# Patient Record
Sex: FEMALE | Race: WHITE | Employment: OTHER | URBAN - METROPOLITAN AREA
[De-identification: names, ages, dates, MRNs, and addresses within clinical notes are randomized per-mention and may not be internally consistent; named-entity substitution may affect disease eponyms.]

---

## 2019-04-22 LAB
ABO + RH BLD: NORMAL
ABO + RH BLD: NORMAL
BLD GP AB SCN SERPL QL: NORMAL
HBV SURFACE AG SERPL QL IA: NORMAL
RUBELLA ABY IGG: 24
RUBELLA ANTIBODY IGG QUANTITATIVE: NORMAL IU/ML

## 2019-11-25 ENCOUNTER — TRANSFERRED RECORDS (OUTPATIENT)
Dept: HEALTH INFORMATION MANAGEMENT | Facility: CLINIC | Age: 34
End: 2019-11-25

## 2019-11-29 LAB — GROUP B STREP PCR: NORMAL

## 2019-12-20 ASSESSMENT — MIFFLIN-ST. JEOR: SCORE: 1387.61

## 2019-12-27 ENCOUNTER — HOSPITAL ENCOUNTER (INPATIENT)
Facility: CLINIC | Age: 34
LOS: 3 days | Discharge: HOME | End: 2019-12-30
Attending: OBSTETRICS & GYNECOLOGY | Admitting: OBSTETRICS & GYNECOLOGY
Payer: COMMERCIAL

## 2019-12-27 PROCEDURE — G0463 HOSPITAL OUTPT CLINIC VISIT: HCPCS | Mod: 25

## 2019-12-27 PROCEDURE — 12000000 ZZH R&B MED SURG/OB

## 2019-12-27 PROCEDURE — 59025 FETAL NON-STRESS TEST: CPT

## 2019-12-27 RX ORDER — METHYLERGONOVINE MALEATE 0.2 MG/ML
200 INJECTION INTRAVENOUS
Status: DISCONTINUED | OUTPATIENT
Start: 2019-12-27 | End: 2019-12-28

## 2019-12-27 RX ORDER — SODIUM CHLORIDE, SODIUM LACTATE, POTASSIUM CHLORIDE, CALCIUM CHLORIDE 600; 310; 30; 20 MG/100ML; MG/100ML; MG/100ML; MG/100ML
INJECTION, SOLUTION INTRAVENOUS CONTINUOUS
Status: DISCONTINUED | OUTPATIENT
Start: 2019-12-27 | End: 2019-12-28

## 2019-12-27 RX ORDER — OXYTOCIN 10 [USP'U]/ML
10 INJECTION, SOLUTION INTRAMUSCULAR; INTRAVENOUS
Status: DISCONTINUED | OUTPATIENT
Start: 2019-12-27 | End: 2019-12-28

## 2019-12-27 RX ORDER — CLINDAMYCIN PHOSPHATE 900 MG/50ML
900 INJECTION, SOLUTION INTRAVENOUS EVERY 8 HOURS
Status: DISCONTINUED | OUTPATIENT
Start: 2019-12-27 | End: 2019-12-28

## 2019-12-27 RX ORDER — ACETAMINOPHEN 325 MG/1
650 TABLET ORAL EVERY 4 HOURS PRN
Status: DISCONTINUED | OUTPATIENT
Start: 2019-12-27 | End: 2019-12-28

## 2019-12-27 RX ORDER — ONDANSETRON 2 MG/ML
4 INJECTION INTRAMUSCULAR; INTRAVENOUS EVERY 6 HOURS PRN
Status: DISCONTINUED | OUTPATIENT
Start: 2019-12-27 | End: 2019-12-28

## 2019-12-27 RX ORDER — OXYTOCIN/0.9 % SODIUM CHLORIDE 30/500 ML
100-340 PLASTIC BAG, INJECTION (ML) INTRAVENOUS CONTINUOUS PRN
Status: COMPLETED | OUTPATIENT
Start: 2019-12-27 | End: 2019-12-28

## 2019-12-27 RX ORDER — IBUPROFEN 400 MG/1
800 TABLET, FILM COATED ORAL
Status: DISCONTINUED | OUTPATIENT
Start: 2019-12-27 | End: 2019-12-28

## 2019-12-27 RX ORDER — OXYCODONE AND ACETAMINOPHEN 5; 325 MG/1; MG/1
1 TABLET ORAL
Status: DISCONTINUED | OUTPATIENT
Start: 2019-12-27 | End: 2019-12-28

## 2019-12-27 RX ORDER — CARBOPROST TROMETHAMINE 250 UG/ML
250 INJECTION, SOLUTION INTRAMUSCULAR
Status: DISCONTINUED | OUTPATIENT
Start: 2019-12-27 | End: 2019-12-28

## 2019-12-27 RX ORDER — NALOXONE HYDROCHLORIDE 0.4 MG/ML
.1-.4 INJECTION, SOLUTION INTRAMUSCULAR; INTRAVENOUS; SUBCUTANEOUS
Status: DISCONTINUED | OUTPATIENT
Start: 2019-12-27 | End: 2019-12-28

## 2019-12-28 ENCOUNTER — ANESTHESIA (OUTPATIENT)
Dept: OBGYN | Facility: CLINIC | Age: 34
End: 2019-12-28
Payer: COMMERCIAL

## 2019-12-28 ENCOUNTER — ANESTHESIA EVENT (OUTPATIENT)
Dept: OBGYN | Facility: CLINIC | Age: 34
End: 2019-12-28
Payer: COMMERCIAL

## 2019-12-28 LAB
ABO + RH BLD: NORMAL
ABO + RH BLD: NORMAL
BLD GP AB SCN SERPL QL: NORMAL
BLOOD BANK CMNT PATIENT-IMP: NORMAL
SPECIMEN EXP DATE BLD: NORMAL
T PALLIDUM AB SER QL: NONREACTIVE

## 2019-12-28 PROCEDURE — 00HU33Z INSERTION OF INFUSION DEVICE INTO SPINAL CANAL, PERCUTANEOUS APPROACH: ICD-10-PCS | Performed by: ANESTHESIOLOGY

## 2019-12-28 PROCEDURE — 25000125 ZZHC RX 250: Performed by: ANESTHESIOLOGY

## 2019-12-28 PROCEDURE — 36415 COLL VENOUS BLD VENIPUNCTURE: CPT | Performed by: OBSTETRICS & GYNECOLOGY

## 2019-12-28 PROCEDURE — 25800030 ZZH RX IP 258 OP 636: Performed by: ANESTHESIOLOGY

## 2019-12-28 PROCEDURE — 25000128 H RX IP 250 OP 636: Performed by: ANESTHESIOLOGY

## 2019-12-28 PROCEDURE — G0463 HOSPITAL OUTPT CLINIC VISIT: HCPCS | Mod: 25

## 2019-12-28 PROCEDURE — 25000132 ZZH RX MED GY IP 250 OP 250 PS 637: Performed by: OBSTETRICS & GYNECOLOGY

## 2019-12-28 PROCEDURE — 25000128 H RX IP 250 OP 636: Performed by: OBSTETRICS & GYNECOLOGY

## 2019-12-28 PROCEDURE — 25000125 ZZHC RX 250: Performed by: OBSTETRICS & GYNECOLOGY

## 2019-12-28 PROCEDURE — 0DQR0ZZ REPAIR ANAL SPHINCTER, OPEN APPROACH: ICD-10-PCS | Performed by: OBSTETRICS & GYNECOLOGY

## 2019-12-28 PROCEDURE — 25800030 ZZH RX IP 258 OP 636: Performed by: OBSTETRICS & GYNECOLOGY

## 2019-12-28 PROCEDURE — 86901 BLOOD TYPING SEROLOGIC RH(D): CPT | Performed by: OBSTETRICS & GYNECOLOGY

## 2019-12-28 PROCEDURE — 37000011 ZZH ANESTHESIA WARD SERVICE

## 2019-12-28 PROCEDURE — 12000035 ZZH R&B POSTPARTUM

## 2019-12-28 PROCEDURE — 59025 FETAL NON-STRESS TEST: CPT

## 2019-12-28 PROCEDURE — 3E0R3BZ INTRODUCTION OF ANESTHETIC AGENT INTO SPINAL CANAL, PERCUTANEOUS APPROACH: ICD-10-PCS | Performed by: ANESTHESIOLOGY

## 2019-12-28 PROCEDURE — 72200001 ZZH LABOR CARE VAGINAL DELIVERY SINGLE

## 2019-12-28 PROCEDURE — 86850 RBC ANTIBODY SCREEN: CPT | Performed by: OBSTETRICS & GYNECOLOGY

## 2019-12-28 PROCEDURE — 86780 TREPONEMA PALLIDUM: CPT | Performed by: OBSTETRICS & GYNECOLOGY

## 2019-12-28 PROCEDURE — 86900 BLOOD TYPING SEROLOGIC ABO: CPT | Performed by: OBSTETRICS & GYNECOLOGY

## 2019-12-28 RX ORDER — OXYTOCIN/0.9 % SODIUM CHLORIDE 30/500 ML
100 PLASTIC BAG, INJECTION (ML) INTRAVENOUS CONTINUOUS
Status: DISCONTINUED | OUTPATIENT
Start: 2019-12-28 | End: 2019-12-30 | Stop reason: HOSPADM

## 2019-12-28 RX ORDER — NALBUPHINE HYDROCHLORIDE 10 MG/ML
2.5-5 INJECTION, SOLUTION INTRAMUSCULAR; INTRAVENOUS; SUBCUTANEOUS EVERY 6 HOURS PRN
Status: DISCONTINUED | OUTPATIENT
Start: 2019-12-28 | End: 2019-12-28

## 2019-12-28 RX ORDER — CEFAZOLIN SODIUM 1 G/3ML
1 INJECTION, POWDER, FOR SOLUTION INTRAMUSCULAR; INTRAVENOUS EVERY 8 HOURS
Status: DISCONTINUED | OUTPATIENT
Start: 2019-12-28 | End: 2019-12-28

## 2019-12-28 RX ORDER — NALOXONE HYDROCHLORIDE 0.4 MG/ML
.1-.4 INJECTION, SOLUTION INTRAMUSCULAR; INTRAVENOUS; SUBCUTANEOUS
Status: DISCONTINUED | OUTPATIENT
Start: 2019-12-28 | End: 2019-12-30 | Stop reason: HOSPADM

## 2019-12-28 RX ORDER — ACETAMINOPHEN 325 MG/1
650 TABLET ORAL EVERY 4 HOURS PRN
Status: DISCONTINUED | OUTPATIENT
Start: 2019-12-28 | End: 2019-12-30 | Stop reason: HOSPADM

## 2019-12-28 RX ORDER — BISACODYL 10 MG
10 SUPPOSITORY, RECTAL RECTAL DAILY PRN
Status: DISCONTINUED | OUTPATIENT
Start: 2019-12-30 | End: 2019-12-30 | Stop reason: HOSPADM

## 2019-12-28 RX ORDER — OXYTOCIN 10 [USP'U]/ML
10 INJECTION, SOLUTION INTRAMUSCULAR; INTRAVENOUS
Status: DISCONTINUED | OUTPATIENT
Start: 2019-12-28 | End: 2019-12-30 | Stop reason: HOSPADM

## 2019-12-28 RX ORDER — ROPIVACAINE HYDROCHLORIDE 2 MG/ML
10 INJECTION, SOLUTION EPIDURAL; INFILTRATION; PERINEURAL ONCE
Status: COMPLETED | OUTPATIENT
Start: 2019-12-28 | End: 2019-12-28

## 2019-12-28 RX ORDER — LIDOCAINE HYDROCHLORIDE AND EPINEPHRINE 15; 5 MG/ML; UG/ML
INJECTION, SOLUTION EPIDURAL PRN
Status: DISCONTINUED | OUTPATIENT
Start: 2019-12-28 | End: 2019-12-29 | Stop reason: HOSPADM

## 2019-12-28 RX ORDER — OXYTOCIN/0.9 % SODIUM CHLORIDE 30/500 ML
1-24 PLASTIC BAG, INJECTION (ML) INTRAVENOUS CONTINUOUS
Status: DISCONTINUED | OUTPATIENT
Start: 2019-12-28 | End: 2019-12-28

## 2019-12-28 RX ORDER — CEFAZOLIN SODIUM 1 G/3ML
INJECTION, POWDER, FOR SOLUTION INTRAMUSCULAR; INTRAVENOUS
Status: DISCONTINUED
Start: 2019-12-28 | End: 2019-12-28 | Stop reason: HOSPADM

## 2019-12-28 RX ORDER — IBUPROFEN 400 MG/1
800 TABLET, FILM COATED ORAL EVERY 6 HOURS PRN
Status: DISCONTINUED | OUTPATIENT
Start: 2019-12-28 | End: 2019-12-30 | Stop reason: HOSPADM

## 2019-12-28 RX ORDER — FENTANYL/BUPIVACAINE/NS/PF 2-1250MCG
12 PLASTIC BAG, INJECTION (ML) INJECTION CONTINUOUS
Status: DISCONTINUED | OUTPATIENT
Start: 2019-12-28 | End: 2019-12-28

## 2019-12-28 RX ORDER — AMOXICILLIN 250 MG
1 CAPSULE ORAL 2 TIMES DAILY
Status: DISCONTINUED | OUTPATIENT
Start: 2019-12-28 | End: 2019-12-30 | Stop reason: HOSPADM

## 2019-12-28 RX ORDER — HYDROCORTISONE 2.5 %
CREAM (GRAM) TOPICAL 3 TIMES DAILY PRN
Status: DISCONTINUED | OUTPATIENT
Start: 2019-12-28 | End: 2019-12-30 | Stop reason: HOSPADM

## 2019-12-28 RX ORDER — HYDROMORPHONE HYDROCHLORIDE 1 MG/ML
0.3 INJECTION, SOLUTION INTRAMUSCULAR; INTRAVENOUS; SUBCUTANEOUS
Status: DISCONTINUED | OUTPATIENT
Start: 2019-12-28 | End: 2019-12-30 | Stop reason: HOSPADM

## 2019-12-28 RX ORDER — FENTANYL CITRATE 50 UG/ML
100 INJECTION, SOLUTION INTRAMUSCULAR; INTRAVENOUS ONCE
Status: COMPLETED | OUTPATIENT
Start: 2019-12-28 | End: 2019-12-28

## 2019-12-28 RX ORDER — EPHEDRINE SULFATE 50 MG/ML
5 INJECTION, SOLUTION INTRAMUSCULAR; INTRAVENOUS; SUBCUTANEOUS
Status: DISCONTINUED | OUTPATIENT
Start: 2019-12-28 | End: 2019-12-28

## 2019-12-28 RX ORDER — OXYTOCIN/0.9 % SODIUM CHLORIDE 30/500 ML
340 PLASTIC BAG, INJECTION (ML) INTRAVENOUS CONTINUOUS PRN
Status: DISCONTINUED | OUTPATIENT
Start: 2019-12-28 | End: 2019-12-30 | Stop reason: HOSPADM

## 2019-12-28 RX ORDER — NALOXONE HYDROCHLORIDE 0.4 MG/ML
.1-.4 INJECTION, SOLUTION INTRAMUSCULAR; INTRAVENOUS; SUBCUTANEOUS
Status: DISCONTINUED | OUTPATIENT
Start: 2019-12-28 | End: 2019-12-28

## 2019-12-28 RX ORDER — LANOLIN 100 %
OINTMENT (GRAM) TOPICAL
Status: DISCONTINUED | OUTPATIENT
Start: 2019-12-28 | End: 2019-12-30 | Stop reason: HOSPADM

## 2019-12-28 RX ORDER — LIDOCAINE 40 MG/G
CREAM TOPICAL
Status: DISCONTINUED | OUTPATIENT
Start: 2019-12-28 | End: 2019-12-28

## 2019-12-28 RX ORDER — MULTIPLE VITAMINS W/ MINERALS TAB 9MG-400MCG
1 TAB ORAL DAILY
COMMUNITY

## 2019-12-28 RX ORDER — CEFAZOLIN SODIUM 2 G/100ML
2 INJECTION, SOLUTION INTRAVENOUS ONCE
Status: COMPLETED | OUTPATIENT
Start: 2019-12-28 | End: 2019-12-28

## 2019-12-28 RX ORDER — AMOXICILLIN 250 MG
2 CAPSULE ORAL 2 TIMES DAILY
Status: DISCONTINUED | OUTPATIENT
Start: 2019-12-28 | End: 2019-12-30 | Stop reason: HOSPADM

## 2019-12-28 RX ORDER — OXYCODONE HYDROCHLORIDE 5 MG/1
5 TABLET ORAL EVERY 4 HOURS PRN
Status: DISCONTINUED | OUTPATIENT
Start: 2019-12-28 | End: 2019-12-30 | Stop reason: HOSPADM

## 2019-12-28 RX ADMIN — CEFAZOLIN SODIUM 2 G: 2 INJECTION, SOLUTION INTRAVENOUS at 01:06

## 2019-12-28 RX ADMIN — IBUPROFEN 800 MG: 400 TABLET ORAL at 21:59

## 2019-12-28 RX ADMIN — Medication 12 ML/HR: at 01:03

## 2019-12-28 RX ADMIN — LIDOCAINE HYDROCHLORIDE AND EPINEPHRINE 3 ML: 15; 5 INJECTION, SOLUTION EPIDURAL at 01:02

## 2019-12-28 RX ADMIN — Medication 2 MILLI-UNITS/MIN: at 09:15

## 2019-12-28 RX ADMIN — Medication 340 ML/HR: at 12:13

## 2019-12-28 RX ADMIN — ROPIVACAINE HYDROCHLORIDE 5 ML: 2 INJECTION, SOLUTION EPIDURAL; INFILTRATION at 01:07

## 2019-12-28 RX ADMIN — FENTANYL CITRATE 100 MCG: 50 INJECTION, SOLUTION INTRAMUSCULAR; INTRAVENOUS at 08:00

## 2019-12-28 RX ADMIN — IBUPROFEN 800 MG: 400 TABLET ORAL at 15:48

## 2019-12-28 RX ADMIN — ACETAMINOPHEN 650 MG: 325 TABLET, FILM COATED ORAL at 15:48

## 2019-12-28 RX ADMIN — SODIUM CHLORIDE, POTASSIUM CHLORIDE, SODIUM LACTATE AND CALCIUM CHLORIDE 1000 ML: 600; 310; 30; 20 INJECTION, SOLUTION INTRAVENOUS at 00:13

## 2019-12-28 RX ADMIN — SENNOSIDES AND DOCUSATE SODIUM 1 TABLET: 8.6; 5 TABLET ORAL at 19:56

## 2019-12-28 RX ADMIN — Medication 12 ML/HR: at 08:15

## 2019-12-28 RX ADMIN — SODIUM CHLORIDE, POTASSIUM CHLORIDE, SODIUM LACTATE AND CALCIUM CHLORIDE: 600; 310; 30; 20 INJECTION, SOLUTION INTRAVENOUS at 01:40

## 2019-12-28 RX ADMIN — ACETAMINOPHEN 650 MG: 325 TABLET, FILM COATED ORAL at 21:59

## 2019-12-28 RX ADMIN — Medication 5 MG: at 01:42

## 2019-12-28 ASSESSMENT — LIFESTYLE VARIABLES: TOBACCO_USE: 0

## 2019-12-28 NOTE — ANESTHESIA PREPROCEDURE EVALUATION
"Anesthesia Pre-Procedure Evaluation    Patient: Patricia Kelly   MRN: [id number] : 1985          Preoperative Diagnosis: * No surgery found *        History reviewed. No pertinent past medical history.  History reviewed. No pertinent surgical history.    Anesthesia Evaluation       history and physical reviewed .      No history of anesthetic complications          ROS/MED HX    ENT/Pulmonary:  - neg pulmonary ROS    (-) tobacco use   Neurologic:  - neg neurologic ROS     Cardiovascular:  - neg cardiovascular ROS       METS/Exercise Tolerance:     Hematologic:         Musculoskeletal:         GI/Hepatic:  - neg GI/hepatic ROS       Renal/Genitourinary:         Endo:         Psychiatric:         Infectious Disease:         Malignancy:         Other:                     neg OB ROS            Physical Exam  Normal systems: cardiovascular, pulmonary and dental    Airway   Mallampati: II    Dental     Cardiovascular       Pulmonary             No results found for: WBC, HGB, HCT, PLT, CRP, SED, NA, POTASSIUM, CHLORIDE, CO2, BUN, CR, GLC, WILL, PHOS, MAG, ALBUMIN, PROTTOTAL, ALT, AST, GGT, ALKPHOS, BILITOTAL, BILIDIRECT, LIPASE, AMYLASE, KAEL, PTT, INR, FIBR, TSH, T4, T3, HCG, HCGS, CKTOTAL, CKMB, TROPN    Preop Vitals  BP Readings from Last 3 Encounters:   19 127/83    Pulse Readings from Last 3 Encounters:   19 56      Resp Readings from Last 3 Encounters:   19    SpO2 Readings from Last 3 Encounters:   No data found for SpO2      Temp Readings from Last 1 Encounters:   19 36.8  C (98.2  F) (Temporal)    Ht Readings from Last 1 Encounters:   19 1.626 m (5' 4\")      Wt Readings from Last 1 Encounters:   19 70.8 kg (156 lb)    Estimated body mass index is 26.78 kg/m  as calculated from the following:    Height as of this encounter: 1.626 m (5' 4\").    Weight as of this encounter: 70.8 kg (156 lb).       Anesthesia Plan      History & Physical Review      ASA Status:  2 .  " OB Epidural Asa: 2            Postoperative Care      Consents  Anesthetic plan, risks, benefits and alternatives discussed with:  Patient..                 Willie Cherry MD

## 2019-12-28 NOTE — L&D DELIVERY NOTE
OB Vaginal Delivery Note    Patricia Kelly MRN# [id number]   Age: 34 year old YOB: 1985       GA: 41w0d  GP:   Labor Complications: None;Failure to Progress in Second Stage   Delivery QBL:  Being calculated  Delivery Type: Vaginal, Vacuum (Extractor)   ROM to Delivery Time: (Delivered) Hours: 10 Minutes: 57   Weight: 7#15 ounces   1 Minute 5 Minute 10 Minute   Apgar Totals: 7    8              Admission details:  at 41w0d admitted in spontaneous labor and SROM at approx 0130.  She is GBS positive and she was started on IV antibiotics. She had an epidural with good relief.  She progressed to complete at 0605.  She started pushing at 0830.  Pitocin was started with pushing to increase contraction frequency. She pushed for 3 hours with good descent but then started to experience maternal exhaustion.  She was consented for a vacuum assisted delivery. Risks of the procedure were reviewed including bleeding, maternal injury and lacerations, and fetal scalp lacerations/injury.   Vacuum was applied at the +3 position.  Guerrero was recently removed.  With each contraction, the pressure was increased to 45 cmHG and three pulls with three pushes.  There was good descent with each pull.  There were three pop offs which brought the head to a +4-+5 position.  The patient was able to push with contractions to complete the delivery.     Delivery Details:  Patricia Kelly, a 34 year old  female delivered a viable infant with apgars of 7   and 8  . Delivery was via vaginal, vacuum (extractor)  to a sterile field under epidural  anesthesia. Infant delivered in  right  occiput  anterior  position. Anterior and posterior shoulders delivered without difficulty. The cord was clamped, cut twice and no abnormalities were noted.   Cord complications: none  Placenta delivered at 2019 12:12 PM . Placental disposition was Hospital disposal . Fundal massage performed and fundus found to be  firm.     Episiotomy: none    Perineum, vagina, cervix were inspected, and the following lacerations were noted:   Perineal lacerations: 3rd with superficial lacerations around the hemorrhoid tissue.   All lacerations were repaired in the usual fashion using 3-0 vicryl.    Excellent hemostasis was noted. Needle count correct. Infant and patient in delivery room in good and stable condition.        Labor Event Times    Labor onset date:  19 Onset time:  12:00 AM   Dilation complete date:  19 Complete time:   6:05 AM   Start pushing date/time:  2019 0830      Labor Events     labor?:  No  Labor Type:  Spontaneous  Predominate monitoring during 1st stage:  continuous electronic fetal monitoring     Antibiotics received during labor?:  Yes  Reason for Antibiotics:  GBS  Antibiotics received for GBS:  Cefazolin  Antibiotics Given (GBS):  Greater than 4 hours prior to delivery     Rupture date/time: 19 0112   Rupture type:  Spontaneous rupture of membranes occuring during spontaneous labor or augmentation  Fluid color:  Clear, Meconium  Fluid odor:  Normal     Delivery/Placenta Date and Time    Delivery Date:  19 Delivery Time:  12:09 PM   Placenta Date/Time:  2019 12:12 PM  Oxytocin given at the time of delivery:  after delivery of placenta     Vaginal Counts     Initial count performed by 2 team members:   Two Team Members   Dr Jason Gutierrez       Needles Suture [place] Sponges Instruments   Initial counts 2  5    Added to count  3     Final counts       Placed during labor Accounted for at the end of labor   No    No    No     Final count performed by 2 team members:   Two Team Members   Dr Jason Gutierrez             Apgars    Living status:  Living   1 Minute 5 Minute 10 Minute 15 Minute 20 Minute   Skin color: 0  0       Heart rate: 2  2       Reflex irritability: 2  2       Muscle tone: 1  2       Respiratory effort: 2  2       Total: 7  8       Apgars assigned by:   SEEMA CHANCE,MERE     Labor Events and Shoulder Dystocia    Fetal Tracing Prior to Delivery:  Category 1, Category 2  Shoulder dystocia present?:  Neg             Delivery (Maternal) (Provider to Complete) (403429)    Episiotomy:  None  Perineal lacerations:  3rd Repaired?:  Yes      Blood Loss  Mother: Patricia Kelly #[id number]   Start of Mother's Information    IO Blood Loss  12/28/19 0000 - 12/28/19 1242    None           End of Mother's Information  Mother: Patricia Kelly #[id number]         Delivery - Provider to Complete (198900)    Attempted Delivery Types (Choose all that apply):  Spontaneous Vaginal Delivery  Delivery Type (Choose the 1 that will go to the Birth History):  Vaginal, Vacuum (Extractor)  Verbal Informed Consent Obtained For Vacuum:  Yes Alternate Labor Strategies Discussed (vacuum):  Yes   Emergency Resources Available (vacuum):  Charge Nurse/Team, Resuscitation Team  Type (vacuum):  mighty vac mushroom hand held Accrued Pulling Time (vacuum):  160 seconds   # of Pop-Offs (vacuum):  3 # of Pulls/Contractions (vacuum):  3   Position (vacuum):  OA Fetal Station (vacuum):  +3   Indication for Operative Vaginal Delivery (vacuum):  Maternal Exhaustion          Placenta    Date/Time:  12/28/2019 12:12 PM  Removal:  Spontaneous  Disposition:  Hospital disposal     Anesthesia    Method:  Epidural  Cervical dilation at placement:  4-7          Presentation and Position    Position:  Right Occiput Anterior           Dannie Gomez MD

## 2019-12-28 NOTE — PLAN OF CARE
Data: Patricia Kelly transferred to 426 via wheelchair at 1520. Baby transferred via parent's arms.  Action: Receiving unit notified of transfer: Yes. Patient and family notified of room change. Report given to Serene Valenzuelasen at 1525. Belongings sent to receiving unit. Accompanied by Registered Nurse. Oriented patient to surroundings. Call light within reach. ID bands double-checked with receiving RN.  Response: Patient tolerated transfer and is stable.

## 2019-12-28 NOTE — ANESTHESIA PROCEDURE NOTES
Peripheral nerve/Neuraxial procedure note : epidural catheter  Pre-Procedure  Performed by Willie Cheryr MD  Location: OB      Pre-Anesthestic Checklist: patient identified, IV checked, site marked, risks and benefits discussed, informed consent, monitors and equipment checked, pre-op evaluation and at physician/surgeon's request    Timeout  Correct Patient: Yes   Correct Procedure: Yes   Correct Site: Yes   Correct Laterality: Yes   Correct Position: Yes   Site Marked: Yes   .   Procedure Documentation    .    Procedure: epidural catheter, .   Patient Position:sitting Insertion Site:L2-3  (midline approach) Injection technique: LORT saline and LORT air   Local skin infiltrated with 1 mL of 1% lidocaine.      Patient Prep/Sterile Barriers; povidone-iodine 7.5% surgical scrub.  .  Needle: Touhy needle   Needle Gauge: 17.    Needle Length (Inches) 3.5   # of attempts: 1 and # of redirects:  .    Catheter: 19 G . .  Catheter threaded easily  .  .   .    Assessment/Narrative  Paresthesias: No.  .  .  Aspiration negative for heme or CSF  . Test dose of 3 mL lidocaine 1.5% w/ 1:200,000 epinephrine at. Test dose negative for signs of intravascular, subdural or intrathecal injection. Comments:  Pre-procedure time out completed. Patient in sitting position, the lumbar spine was prepped and draped in sterile fashion. The L2/L3 interspace was identified and local anesthetic was injected for local skin infiltration. A 17 G touhy needle was advanced to the epidural space which was confirmed with the loss of resistance technique at 4 cm. A catheter was then advanced easily into the epidural space. The catheter was left at 8 cm at the skin. Negative aspiration of blood and CSF was confirmed. A test dose of 1.5% lidocaine with 1:200,000 epinephrine was injected through the catheter and was negative for intravascular injection. The site was covered with sterile tegaderm and the catheter was secured with tape.

## 2019-12-28 NOTE — PLAN OF CARE
"Patient present to labor and delivery with complaints of contractions which became strong about one hour ago.  EFM and TOCO applied with patient's consent.  History obtained.  Patient is GBS positive and states she has an allergy to amoxicillin.  States the reaction was a rash as a baby, no reports of \"throat closing up or anything.\"  Call placed to Dr. Lan to update on labor status.  Cervical exam 5 cms dilated, 95 percent effacement, -1 to a 0 station, intact.    0003:  Dr. Lan updated that patient states her reaction of amoxicillin was a rash.  MD orders ancef for GBS and discontinued clindamycin order.    Report to Emerson Sanchez RN who will assume cares  "

## 2019-12-28 NOTE — H&P
2019    Patricia Kelly  [id number]            OB Admit History & Physical    CC: SROM, labor    HPI:  at 41w0d c/o lof at approx 0130 this morning.  Contractions starting at 2230 on 19.  Pt has had an uncomplicated pregnancy overall.  She did fail her one hour glucose testing but three hour was normal.   She opted to do genetic screening with NIPT and NT which decreased her risk for aneuploidy.  GBS positive    Estimated fetal weight= 7 lbs     Her OB history:   OB History    Para Term  AB Living   2 0 0 0 1 0   SAB TAB Ectopic Multiple Live Births   1 0 0 0 0      # Outcome Date GA Lbr David/2nd Weight Sex Delivery Anes PTL Lv   2 Current            1 SAB                   History reviewed. No pertinent past medical history.     History reviewed. No pertinent surgical history.      No current outpatient medications on file.       Allergies: Amoxicillin      REVIEW OF SYSTEMS:  NEUROLOGIC:  Negative  EYES:  Negative  ENT:  Negative  GI:  Negative  BREAST:  Negative  :  Negative  GYN:  Negative  CV:  Negative  PULMONARY:  Negative  MUSCULOSKELETAL:  Negative  PSYCH:  Negative        Social History     Socioeconomic History     Marital status:      Spouse name: Not on file     Number of children: Not on file     Years of education: Not on file     Highest education level: Not on file   Occupational History     Not on file   Social Needs     Financial resource strain: Not on file     Food insecurity:     Worry: Not on file     Inability: Not on file     Transportation needs:     Medical: Not on file     Non-medical: Not on file   Tobacco Use     Smoking status: Never Smoker     Smokeless tobacco: Never Used   Substance and Sexual Activity     Alcohol use: Not Currently     Drug use: Never     Sexual activity: Yes     Partners: Male   Lifestyle     Physical activity:     Days per week: Not on file     Minutes per session: Not on file     Stress: Not on file  "  Relationships     Social connections:     Talks on phone: Not on file     Gets together: Not on file     Attends Buddhism service: Not on file     Active member of club or organization: Not on file     Attends meetings of clubs or organizations: Not on file     Relationship status: Not on file     Intimate partner violence:     Fear of current or ex partner: Not on file     Emotionally abused: Not on file     Physically abused: Not on file     Forced sexual activity: Not on file   Other Topics Concern     Not on file   Social History Narrative     Not on file      History reviewed. No pertinent family history.          Vitals:   /70   Pulse 56   Temp 98.6  F (37  C) (Temporal)   Resp 20   Ht 1.626 m (5' 4\")   Wt 70.8 kg (156 lb)   SpO2 100%   BMI 26.78 kg/m     , mod jude reactive    With contractions every  5-8 min    Alert Awake in NAD  HEENT grossly normal  Neck: no lymphadenopathy or thryoidomegaly  ABD gravid  Pelvic: meconium stained fluid noted- new since pushing  Cervix is complete/ +2, pushing on my arrival  EXT:  no edema or calf tenderness  Neuro:  Intact, has epidural in place    Assessment:  IUP at 41w0d  SROM, labor  GBS postive    Plan:  Admit and abx started.  Continue pushing and care for anticipated vaginal delivery.      Dannie Gomez MD MD  Dept of OB/GYN  December 28, 2019   "

## 2019-12-28 NOTE — PROGRESS NOTES
Plan to labor down for one hour per M.D. Hammers orders. Will recheck at 0700 if she is feeling pressure will begin pushing. If no pressure plan to continue to labor down.

## 2019-12-29 LAB — HGB BLD-MCNC: 9.9 G/DL (ref 11.7–15.7)

## 2019-12-29 PROCEDURE — 85018 HEMOGLOBIN: CPT | Performed by: OBSTETRICS & GYNECOLOGY

## 2019-12-29 PROCEDURE — 12000035 ZZH R&B POSTPARTUM

## 2019-12-29 PROCEDURE — 25000132 ZZH RX MED GY IP 250 OP 250 PS 637: Performed by: OBSTETRICS & GYNECOLOGY

## 2019-12-29 PROCEDURE — 36415 COLL VENOUS BLD VENIPUNCTURE: CPT | Performed by: OBSTETRICS & GYNECOLOGY

## 2019-12-29 RX ADMIN — ACETAMINOPHEN 650 MG: 325 TABLET, FILM COATED ORAL at 04:00

## 2019-12-29 RX ADMIN — IBUPROFEN 800 MG: 400 TABLET ORAL at 09:57

## 2019-12-29 RX ADMIN — IBUPROFEN 800 MG: 400 TABLET ORAL at 04:00

## 2019-12-29 RX ADMIN — IBUPROFEN 800 MG: 400 TABLET ORAL at 16:11

## 2019-12-29 RX ADMIN — ACETAMINOPHEN 650 MG: 325 TABLET, FILM COATED ORAL at 16:11

## 2019-12-29 RX ADMIN — ACETAMINOPHEN 650 MG: 325 TABLET, FILM COATED ORAL at 23:33

## 2019-12-29 RX ADMIN — SENNOSIDES AND DOCUSATE SODIUM 2 TABLET: 8.6; 5 TABLET ORAL at 20:35

## 2019-12-29 RX ADMIN — ACETAMINOPHEN 650 MG: 325 TABLET, FILM COATED ORAL at 09:57

## 2019-12-29 RX ADMIN — SENNOSIDES AND DOCUSATE SODIUM 2 TABLET: 8.6; 5 TABLET ORAL at 08:31

## 2019-12-29 RX ADMIN — IBUPROFEN 800 MG: 400 TABLET ORAL at 23:33

## 2019-12-29 NOTE — PLAN OF CARE
Fundus firm and bleeding wnl-  using ice and tucks and perineum swollen.  VSS.  Voiding without difficulty and IV removed.  Up independently.  Rates pain 2/10 and taking tylenol and ibuprofen every 6 hrs with good relief.  Encouraged to call with questions or concerns.  Will continue to monitor.

## 2019-12-29 NOTE — LACTATION NOTE
Initial Lactation visit with Patricia, significant other Toni & baby girl Collette . Patricia reports feeding is going well so far. Encouraged to ask for breastfeeding support and latch checks as needed. Reviewed typical early infant feeding patterns over first few days of life.    Recommend unlimited, frequent breast feedings: At least 8 - 12 times every 24 hours. Recommended rooming in. Instructed in hand expression. Avoid pacifiers and supplementation with formula unless medically indicated. Explained benefits of holding baby skin on skin to help promote better breastfeeding outcomes. Patricia has a pump for home use. Patricia & Toni appreciative of visit. Will revisit as needed.    Elvie Ponce, BSN, PHN, RN-C, MNN, IBCLC

## 2019-12-29 NOTE — ANESTHESIA POSTPROCEDURE EVALUATION
Patient: Patricia Kelly    * No procedures listed *    Diagnosis:* No pre-op diagnosis entered *  Diagnosis Additional Information: No value filed.    Anesthesia Type:  No value filed.    Note:  Anesthesia Post Evaluation    Patient location during evaluation: Floor  Patient participation: Able to fully participate in evaluation     Anesthetic complications: None    Comments: Pt doing well.  Denies epidural-related complaints.        Last vitals:  Vitals:    12/28/19 2200 12/29/19 0830 12/29/19 1611   BP: 111/73 124/82 115/75   Pulse:      Resp: 16 14 16   Temp: 36.9  C (98.4  F) 36.7  C (98  F) 36.7  C (98.1  F)   SpO2:            Electronically Signed By: JHON SHERIDAN MD  December 29, 2019  5:05 PM

## 2019-12-29 NOTE — PLAN OF CARE
Pt transferred to room 426 at approx 1600 via wheelchair with infant in arms and spouse at side. Tylenol and ibuprofen given for discomfort. Pt able to void x1 since arrival. Tucks and ice used for comfort. PIV saline locked, intact. Fundus firm U/2, scant rubra flow. Breast feeding infant. Oriented pt and spouse to call light, unit routines, safety and plan of care. Encouraged to call with questions or concerns.

## 2019-12-29 NOTE — PLAN OF CARE
VSS. Pain well controlled with tylenol and ibuprofen. Offered oxycodone, but declined. Breast feeding infant. Fundus firm, U/3 scant rubra flow. Pt using ice and tucks to perineum. Hgb 9.9 today. Discussed plan of care with pt and spouse. All questions and concerns addressed with pt/spouse. Cont to monitor and assess.

## 2019-12-29 NOTE — PROGRESS NOTES
"Patricia Kelly  2019   PPD1 s/p VAVD    S: 34 year old  delivered at 41w0d   Doing well without complaints.  Sore but medication helping.   Lochia moderate.   Ambulating.  Urinating without issues.  Breastfeeding.    O: /82   Pulse 59   Temp 98  F (36.7  C) (Oral)   Resp 14   Ht 1.626 m (5' 4\")   Wt 70.8 kg (156 lb)   SpO2 99%   Breastfeeding Unknown   BMI 26.78 kg/m    Gen: NAD  Abd: soft, NT, ND, FF 1 below U  Ext: NT, nonedematous    A/P:  34 year old  PPD1 s/p VAVD for maternal exhaustion after pushing for 3.5 hours  - ibuprofen and acetaminophen PRN pain  - support breastfeeding  - monitor lochia  - encourage ambulation  - regular diet  - routine PP care  - Hgb 9.9 - reviewed high iron foods, consider oral iron supplementation upon discharge but did discuss likely to increase constipation and subsequently pain  - anticipate discharge to home tomorrow (PPD2)    Nuvia Villarreal MD  Text Page (8am - 5pm)    "

## 2019-12-29 NOTE — LACTATION NOTE
Follow up visit with Patricia, significant other Toni & baby Collette. Patricia reports feeding is going well so far, with baby Collette feeding frequently and cluster feeding at times. Patricia shared her nipples are getting tender with initial latch. She is using lanolin.Reviewed milk supply and engorgement. General questions answered regarding pacifier use and AAP guidelines to wait until breastfeeding is well established. Reviewed typical  infant feeding patterns and cluster feeding. Breastfeeding section reviewed in [facility] patient education booklet.    Feeding plan: Recommend unlimited, frequent breast feedings: At least 8 - 12 times every 24 hours. Avoid pacifiers and supplementation with formula unless medically indicated. Encouraged use of feeding log and to record feedings, and void/stool patterns. Patricia has a pump for home use.  Encouraged to call with needs, will revisit as needed. Patricia & Toni appreciative of visit.    Elvie Ponce, BSN, PHN, RN-C, MNN, IBCLC

## 2019-12-30 VITALS
OXYGEN SATURATION: 99 % | DIASTOLIC BLOOD PRESSURE: 84 MMHG | SYSTOLIC BLOOD PRESSURE: 120 MMHG | RESPIRATION RATE: 16 BRPM | HEART RATE: 59 BPM | WEIGHT: 156 LBS | BODY MASS INDEX: 26.63 KG/M2 | HEIGHT: 64 IN | TEMPERATURE: 98.3 F

## 2019-12-30 PROCEDURE — 25000132 ZZH RX MED GY IP 250 OP 250 PS 637: Performed by: OBSTETRICS & GYNECOLOGY

## 2019-12-30 RX ORDER — IBUPROFEN 800 MG/1
800 TABLET, FILM COATED ORAL EVERY 6 HOURS PRN
Qty: 60 TABLET | Refills: 0 | Status: SHIPPED | OUTPATIENT
Start: 2019-12-30

## 2019-12-30 RX ORDER — ACETAMINOPHEN 325 MG/1
650 TABLET ORAL EVERY 4 HOURS PRN
Qty: 100 TABLET | Refills: 0 | Status: SHIPPED | OUTPATIENT
Start: 2019-12-30

## 2019-12-30 RX ADMIN — ACETAMINOPHEN 650 MG: 325 TABLET, FILM COATED ORAL at 07:13

## 2019-12-30 RX ADMIN — SENNOSIDES AND DOCUSATE SODIUM 2 TABLET: 8.6; 5 TABLET ORAL at 07:56

## 2019-12-30 RX ADMIN — IBUPROFEN 800 MG: 400 TABLET ORAL at 07:14

## 2019-12-30 NOTE — LACTATION NOTE
Routine visit with Patricia and baby.  FOB asleep at time of visit.   Getting ready for discharge.  Plan: Watch for feeding cues and feed every 2-3 hours and/or on demand. Continue to use feeding log to track intake and appropriate voids and stools. Take feeding log to first follow up appointment or weight check. Encourage skin to skin to promote frequent feedings, thermoregulation and bonding. Follow-up with healthcare provider or lactation consultant for questions or concerns. Has a breast pump for home and plans to follow up at [facility].  Dicussed LC resource. Baby cluster fed last night.   Instructed on signs/symptoms of engorgement/ plugged ducts and mastitis.  Instructed on comfort measures and when to call MD.  No further questions at this time. Will follow as needed. Anne Corcoran BSN, RN, PHN, RNC-MNN, IBCLC

## 2019-12-30 NOTE — PLAN OF CARE
Vital signs stable. Postpartum assessment WDL. Pain controlled with Tylenol and Ibuprofen. Patient voiding without difficulty. Breastfeeding on cue with assist. Patient and infant bonding well. Will continue with current plan of care.

## 2019-12-30 NOTE — PROGRESS NOTES
"Patricia Kelly  2019   PPD2 s/p VAVD     S: 34 year old  delivered at 41w0d   Doing well without complaints.  Sore but medication helping.   Lochia moderate.   Ambulating.  Urinating without issues.  Breastfeeding.  Having a little numbness anteriorly in her thighs.     O: /76   Pulse 59   Temp 98.3  F (36.8  C) (Oral)   Resp 16   Ht 1.626 m (5' 4\")   Wt 70.8 kg (156 lb)   SpO2 99%   Breastfeeding Unknown   BMI 26.78 kg/m    Gen: NAD, sitting up breastfeeding  Abd: soft, NT, ND, FF 2 below U  Ext: NT, nonedematous    A/P:  34 year old  PPD2 s/p VAVD for maternal exhaustion after pushing 3.5 hours  - ibuprofen and acetaminophen PRN pain  - support breastfeeding  - monitor lochia  - encourage ambulation  - regular diet  - routine PP care  - Hgb 9.9 - reviewed healthy diet with high iron foods  - reviewed that anterior thigh numbness likely related to positioning throughout long course of pushing; discussed monitoring symptoms, would recommend eval if unequal numbness, extending lower down in leg, discoloration, unequal swelling, pain  - stable for discharge to home    Nuvia Villarreal MD  Text Page (8am - 5pm)    "

## 2019-12-30 NOTE — PLAN OF CARE
Mom and baby discharge instructions discussed with pt and spouse who verbalize understanding. Discharge prescriptions filled at discharge pharmacy and given to pt. Pt has breast pump at home. Mom and baby ID bands matched. Security alarms removed. Mom and baby ready for discharge home.

## 2019-12-30 NOTE — DISCHARGE INSTRUCTIONS
Please call the office if you experience  - bleeding through more than a pad per hour persistently  - passage of blood clots greater than the size of janet  - abnormal vaginal discharge  - temperature greater than 100.4  - inability to tolerate food or fluid  - pain not controlled with oral medications  - persistent headache, vision changes, chest pain, shortness of breath, pain in the upper belly  - significant breast pain  - mood changes that affect your quality of life    Please make an appointment at [facility] in 6 weeks for a postpartum visit.     Postpartum Vaginal Delivery Instructions    Activity       Ask family and friends for help when you need it.    Do not place anything in your vagina for 6 weeks.    You are not restricted on other activities, but take it easy for a few weeks to allow your body to recover from delivery.  You are able to do any activities you feel up to that point.    No driving until you have stopped taking your pain medications (usually two weeks after delivery).     Call your health care provider if you have any of these symptoms:       Increased pain, swelling, redness, or fluid around your stiches from an episiotomy or perineal tear.    A fever above 100.4 F (38 C) with or without chills when placing a thermometer under your tongue.    You soak a sanitary pad with blood within 1 hour, or you see blood clots larger than a golf ball.    Bleeding that lasts more than 6 weeks.    Vaginal discharge that smells bad.    Severe pain, cramping or tenderness in your lower belly area.    A need to urinate more frequently (use the toilet more often), more urgently (use the toilet very quickly), or it burns when you urinate.    Nausea and vomiting.    Redness, swelling or pain around a vein in your leg.    Problems breastfeeding or a red or painful area on your breast.    Chest pain and cough or are gasping for air.    Problems coping with sadness, anxiety, or depression.  If you have any  concerns about hurting yourself or the baby, call your provider immediately.     You have questions or concerns after you return home.     Keep your hands clean:  Always wash your hands before touching your perineal area and stitches.  This helps reduce your risk of infection.  If your hands aren't dirty, you may use an alcohol hand-rub to clean your hands. Keep your nails clean and short.

## 2020-03-11 ENCOUNTER — HEALTH MAINTENANCE LETTER (OUTPATIENT)
Age: 35
End: 2020-03-11

## 2021-01-04 ENCOUNTER — HEALTH MAINTENANCE LETTER (OUTPATIENT)
Age: 36
End: 2021-01-04

## 2021-04-25 ENCOUNTER — HEALTH MAINTENANCE LETTER (OUTPATIENT)
Age: 36
End: 2021-04-25

## 2021-05-06 ENCOUNTER — APPOINTMENT (OUTPATIENT)
Dept: CT IMAGING | Facility: CLINIC | Age: 36
End: 2021-05-06
Attending: EMERGENCY MEDICINE
Payer: COMMERCIAL

## 2021-05-06 ENCOUNTER — HOSPITAL ENCOUNTER (EMERGENCY)
Facility: CLINIC | Age: 36
Discharge: HOME | End: 2021-05-06
Attending: EMERGENCY MEDICINE | Admitting: EMERGENCY MEDICINE
Payer: COMMERCIAL

## 2021-05-06 VITALS
OXYGEN SATURATION: 100 % | DIASTOLIC BLOOD PRESSURE: 82 MMHG | TEMPERATURE: 98.3 F | HEART RATE: 65 BPM | RESPIRATION RATE: 17 BRPM | SYSTOLIC BLOOD PRESSURE: 121 MMHG

## 2021-05-06 DIAGNOSIS — R10.31 RLQ ABDOMINAL PAIN: ICD-10-CM

## 2021-05-06 LAB
ALBUMIN UR-MCNC: 20 MG/DL
ANION GAP SERPL CALCULATED.3IONS-SCNC: 3 MMOL/L (ref 3–14)
APPEARANCE UR: CLEAR
BASOPHILS # BLD AUTO: 0.1 10E9/L (ref 0–0.2)
BASOPHILS NFR BLD AUTO: 1.4 %
BILIRUB UR QL STRIP: NEGATIVE
BUN SERPL-MCNC: 14 MG/DL (ref 7–30)
CALCIUM SERPL-MCNC: 8.9 MG/DL (ref 8.5–10.1)
CHLORIDE SERPL-SCNC: 109 MMOL/L (ref 94–109)
CO2 SERPL-SCNC: 27 MMOL/L (ref 20–32)
COLOR UR AUTO: YELLOW
CREAT SERPL-MCNC: 0.74 MG/DL (ref 0.52–1.04)
DIFFERENTIAL METHOD BLD: NORMAL
EOSINOPHIL # BLD AUTO: 0.7 10E9/L (ref 0–0.7)
EOSINOPHIL NFR BLD AUTO: 9.7 %
ERYTHROCYTE [DISTWIDTH] IN BLOOD BY AUTOMATED COUNT: 12.3 % (ref 10–15)
GFR SERPL CREATININE-BSD FRML MDRD: >90 ML/MIN/{1.73_M2}
GLUCOSE SERPL-MCNC: 80 MG/DL (ref 70–99)
GLUCOSE UR STRIP-MCNC: NEGATIVE MG/DL
HCG SERPL QL: NEGATIVE
HCT VFR BLD AUTO: 41.2 % (ref 35–47)
HGB BLD-MCNC: 13.8 G/DL (ref 11.7–15.7)
HGB UR QL STRIP: NEGATIVE
IMM GRANULOCYTES # BLD: 0 10E9/L (ref 0–0.4)
IMM GRANULOCYTES NFR BLD: 0.3 %
KETONES UR STRIP-MCNC: 5 MG/DL
LEUKOCYTE ESTERASE UR QL STRIP: NEGATIVE
LYMPHOCYTES # BLD AUTO: 2.6 10E9/L (ref 0.8–5.3)
LYMPHOCYTES NFR BLD AUTO: 36.6 %
MCH RBC QN AUTO: 30.9 PG (ref 26.5–33)
MCHC RBC AUTO-ENTMCNC: 33.5 G/DL (ref 31.5–36.5)
MCV RBC AUTO: 92 FL (ref 78–100)
MONOCYTES # BLD AUTO: 0.6 10E9/L (ref 0–1.3)
MONOCYTES NFR BLD AUTO: 8.3 %
MUCOUS THREADS #/AREA URNS LPF: PRESENT /LPF
NEUTROPHILS # BLD AUTO: 3.1 10E9/L (ref 1.6–8.3)
NEUTROPHILS NFR BLD AUTO: 43.7 %
NITRATE UR QL: NEGATIVE
NRBC # BLD AUTO: 0 10*3/UL
NRBC BLD AUTO-RTO: 0 /100
PH UR STRIP: 5.5 PH (ref 5–7)
PLATELET # BLD AUTO: 284 10E9/L (ref 150–450)
POTASSIUM SERPL-SCNC: 3.6 MMOL/L (ref 3.4–5.3)
RBC # BLD AUTO: 4.46 10E12/L (ref 3.8–5.2)
RBC #/AREA URNS AUTO: 1 /HPF (ref 0–2)
SODIUM SERPL-SCNC: 139 MMOL/L (ref 133–144)
SOURCE: ABNORMAL
SP GR UR STRIP: 1.03 (ref 1–1.03)
SQUAMOUS #/AREA URNS AUTO: 1 /HPF (ref 0–1)
UROBILINOGEN UR STRIP-MCNC: 0 MG/DL (ref 0–2)
WBC # BLD AUTO: 7.1 10E9/L (ref 4–11)
WBC #/AREA URNS AUTO: 1 /HPF (ref 0–5)

## 2021-05-06 PROCEDURE — 250N000009 HC RX 250: Performed by: EMERGENCY MEDICINE

## 2021-05-06 PROCEDURE — 80048 BASIC METABOLIC PNL TOTAL CA: CPT | Performed by: EMERGENCY MEDICINE

## 2021-05-06 PROCEDURE — 99285 EMERGENCY DEPT VISIT HI MDM: CPT | Mod: 25

## 2021-05-06 PROCEDURE — 84703 CHORIONIC GONADOTROPIN ASSAY: CPT | Performed by: EMERGENCY MEDICINE

## 2021-05-06 PROCEDURE — 81001 URINALYSIS AUTO W/SCOPE: CPT | Performed by: EMERGENCY MEDICINE

## 2021-05-06 PROCEDURE — 250N000011 HC RX IP 250 OP 636: Performed by: EMERGENCY MEDICINE

## 2021-05-06 PROCEDURE — 74177 CT ABD & PELVIS W/CONTRAST: CPT | Mod: 59

## 2021-05-06 PROCEDURE — 85025 COMPLETE CBC W/AUTO DIFF WBC: CPT | Performed by: EMERGENCY MEDICINE

## 2021-05-06 RX ORDER — IOPAMIDOL 755 MG/ML
79 INJECTION, SOLUTION INTRAVASCULAR ONCE
Status: COMPLETED | OUTPATIENT
Start: 2021-05-06 | End: 2021-05-06

## 2021-05-06 RX ADMIN — IOPAMIDOL 79 ML: 755 INJECTION, SOLUTION INTRAVENOUS at 20:42

## 2021-05-06 RX ADMIN — SODIUM CHLORIDE 62 ML: 9 INJECTION, SOLUTION INTRAVENOUS at 20:42

## 2021-05-06 ASSESSMENT — ENCOUNTER SYMPTOMS
NAUSEA: 0
DYSURIA: 0
DIARRHEA: 1
ABDOMINAL PAIN: 1
VOMITING: 0
FREQUENCY: 0
CHILLS: 0
HEMATURIA: 0

## 2021-05-07 NOTE — ED PROVIDER NOTES
History   Chief Complaint:  Abdominal Pain    HPI   Patricia Kelly is a 36 year old female, who presents to the ED for evaluation of abdominal pain. The patient reports she had the onset of right lower quadrant, non-radiating, abdominal pain beginning this morning. She states the pain is not worse with movement and she only feels it when she puts pressure on her right lower quadrant. The patient has not had any fever, nausea, or vomiting. She has had diarrhea intermittently for the past three weeks and about 2-3 pounds of weight loss. She has not had any surgery on her abdomen. The patient denies taking any analgesics. The patient denies any chills, urinary symptoms, or any other acute symptoms.     Review of Systems   Constitutional: Negative for chills.   Gastrointestinal: Positive for abdominal pain and diarrhea. Negative for nausea and vomiting.   Genitourinary: Negative for dysuria, frequency and hematuria.   All other systems reviewed and are negative.    Allergies:  Amoxicillin    Medications:    Zoloft    Past Medical History:    Anxiety    Past Surgical History:    [place] teeth extraction    Family History:    Glioblastoma    Social History:  PCP: Nuvia Villarreal  Presents to the ED alone    Physical Exam     Patient Vitals for the past 24 hrs:   BP Temp Temp src Pulse Resp SpO2   05/06/21 2030 (!) 123/93 -- -- 60 -- 98 %   05/06/21 2015 127/88 -- -- 59 -- 98 %   05/06/21 2000 126/85 -- -- 59 -- 100 %   05/06/21 1839 -- 98.3  F (36.8  C) Oral 73 17 100 %       Physical Exam  General: Well-nourished,  appears to be resting comfortably when I enter the room  Eyes: PERRL, conjunctivae pink no scleral icterus or conjunctival injection  ENT:  Moist mucus membranes, posterior oropharynx clear without erythema or exudates  Respiratory:  Lungs clear to auscultation bilaterally, no crackles/rubs/wheezes.  Good air movement  CV: Normal rate and rhythm, no murmurs/rubs/gallops  GI:  Abdomen soft and  non-distended.  Normoactive BS.  +tenderness to deep palpation right lower abd/pelvis. No guarding or rebound  Skin: Warm, dry.  No rashes or petechiae  Musculoskeletal: No peripheral edema or calf tenderness  Neuro: Alert and oriented to person/place/time  Psychiatric: Normal affect    Emergency Department Course   Imaging:    CT Abd/pelvis with contrast:  1.  No acute changes in the abdomen or pelvis to account for patient's   symptoms. No bowel obstruction, diverticulitis or appendicitis. No   urinary tract calculi or hydronephrosis.     Imaging independently reviewed and agree with radiologist interpretation.     Laboratory:  CBC: WBC 7.1, HGB 13.8,     BMP: WNL (Creatinine 0.74)    HCG Qualitative: Negative    UA: Ketone 5, Protein Total 20, Mucous Present, o/w Negative    Emergency Department Course:    Reviewed:  I reviewed the patient's nursing notes, vitals, past available medical records.     Assessments:  1925: I obtained history and examined the patient as noted above.     2115: I rechecked the patient and explained findings. The patient understands the plan and is amendable to discharge. All questions answered.     Disposition:  The patient was discharged to home.    Impression & Plan    Medical Decision Making:  Patricia Kelly is a 36 year old female who comes today with right lower quadrant pain and tenderness.  Labs are reassuring.  No signs of a kidney stone or infection on urinalysis.  CT scan was obtained and shows no evidence of appendicitis.  The patient is otherwise well-appearing and nontoxic.  I do believe she safe for discharge home at this time.  Patient and her  were updated and were in agreement with the plan.    Covid-19  Patricia Kelly was evaluated during a global COVID-19 pandemic, which necessitated consideration that the patient might be at risk for infection with the SARS-CoV-2 virus that causes COVID-19.   Applicable protocols for evaluation were followed  during the patient's care.   COVID-19 was considered as part of the patient's evaluation. The plan for testing is:  a test was obtained during this visit.    Diagnosis:    ICD-10-CM    1. RLQ abdominal pain  R10.31        Discharge Medications:  Discharge Medication List as of 5/6/2021  9:36 PM        Scribe Disclosure:  Dario BRYAN, am serving as a scribe at 7:25 PM on 5/6/2021 to document services personally performed by Ina Mejias MD based on my observations and the provider's statements to me.      Ina Mejias MD  05/07/21 1581

## 2021-05-07 NOTE — DISCHARGE INSTRUCTIONS
*Get plenty of rest and avoid strenuous activities.  *Take medications as prescribed.  Tylenol and/or ibuprofen as directed as needed for pain.   *Follow-up with your doctor for a recheck within 12-36 hours.  *Return to the ER if you develop fever, worsening pain, faint or feel like you will faint or become worse in any way.    Discharge Instructions  Abdominal Pain    Abdominal pain can be caused by many things. Your evaluation today does not show the exact cause for your pain. Your doctor today has decided that it is unlikely your pain is due to a life threatening problem, or a problem requiring surgery or hospital admission. Sometimes those problems cannot be found right away, so it is very important that you follow up as directed.  Sometimes only the changes which occur over time allow the cause of your pain to be found.    Return to the Emergency Department for a recheck in 8-12 hours if your pain continues.  If your pain gets worse, changes in location, or feels different, return to the Emergency Department right away.    ADULTS:  Return to the Emergency Department right away if:    You get an oral temperature above 102oF or as directed by your doctor.  You have blood in your stools (bright red or black, tarry stools).  You keep throwing up or can t drink liquids.  You see blood when you throw up.  You can t have a bowel movement or you can t pass gas.  Your stomach gets bloated or bigger.  Your skin or the whites of your eyes look yellow.  You faint.  You have bloody, frequent or painful urination.  You have new symptoms or anything that worries you.    CHILDREN:  Return to the Emergency Department right away if your child has any of the above-listed symptoms or the following:    Pushes your hand away or screams/cries when his/her belly is touched.  You notice your child is very fussy or weak.  Your child is very tired and is too tired to eat or drink.  Your child is dehydrated.  Signs of dehydration can  be:  Your infant has had no wet diapers in 4-5 hours.  Your older child has not passed urine in 6-8 hours.  Your infant or child starts to have dry mouth and lips, or no saliva or tears.    PREGNANT WOMEN:  Return to the Emergency Department right away if you have any of the above-listed symptoms or the following:    You have bleeding, leaking fluid or passing tissue from the vagina.  You have worse pain or cramping, or pain in your shoulder or back.  You have vomiting that will not stop.  You have painful or bloody urination.  You have a temperature of 100oF or more.  Your baby is not moving as much as usual.  You faint.  You get a bad headache with or without eye problems and abdominal pain.  You have a convulsion or seizure.  You have unusual discharge from your vagina and abdominal pain.    Abdominal pain is pretty common during pregnancy.  Your pain may or may not be related to your pregnancy. You should follow-up closely with your OB doctor so they can evaluate you and your baby.  Until you follow-up with your regular doctor, do the following:     Avoid sex and do not put anything in your vagina.  Drink clear fluids.  Only take medications approved by your doctor.    MORE INFORMATION:    Appendicitis:  A possible cause of abdominal pain in any person who still has their appendix is acute appendicitis. Appendicitis is often hard to diagnose.  Testing does not always rule out early appendicitis or other causes of abdominal pain. Close follow-up with your doctor and re-evaluations may be needed to figure out the reason for your abdominal pain.    Follow-up:  It is very important that you make an appointment with your clinic and go to the appointment.  If you do not follow-up with your primary doctor, it may result in missing an important development which could result in permanent injury or disability and/or lasting pain.  If there is any problem keeping your appointment, call your doctor or return to the  "Emergency Department.    Medications:  Take your medications as directed by your doctor today.  Before using over-the-counter medications, ask your doctor and make sure to take the medications as directed.  If you have any questions about medications, ask your doctor.    Diet:  Resume your normal diet as much as possible, but do not eat fried, fatty or spicy foods while you have pain.  Do not drink alcohol or have caffeine.  Do not smoke tobacco.    Probiotics: If you have been given an antibiotic, you may want to also take a probiotic pill or eat yogurt with live cultures. Probiotics have \"good bacteria\" to help your intestines stay healthy. Studies have shown that probiotics help prevent diarrhea and other intestine problems (including C. diff infection) when you take antibiotics. You can buy these without a prescription in the pharmacy section of the store.     If you were given a prescription for medicine here today, be sure to read all of the information (including the package insert) that comes with your prescription.  This will include important information about the medicine, its side effects, and any warnings that you need to know about.  The pharmacist who fills the prescription can provide more information and answer questions you may have about the medicine.  If you have questions or concerns that the pharmacist cannot address, please call or return to the Emergency Department.         Opioid Medication Information    Pain medications are among the most commonly prescribed medicines, so we are including this information for all our patients. If you did not receive pain medication or get a prescription for pain medicine, you can ignore it.     You may have been given a prescription for an opioid (narcotic) pain medicine and/or have received a pain medicine while here in the Emergency Department. These medicines can make you drowsy or impaired. You must not drive, operate dangerous equipment, or engage in " any other dangerous activities while taking these medications. If you drive while taking these medications, you could be arrested for DUI, or driving under the influence. Do not drink any alcohol while you are taking these medications.     Opioid pain medications can cause addiction. If you have a history of chemical dependency of any type, you are at a higher risk of becoming addicted to pain medications.  Only take these prescribed medications to treat your pain when all other options have been tried. Take it for as short a time and as few doses as possible. Store your pain pills in a secure place, as they are frequently stolen and provide a dangerous opportunity for children or visitors in your house to start abusing these powerful medications. We will not replace any lost or stolen medicine.  As soon as your pain is better, you should flush all your remaining medication.     Many prescription pain medications contain Tylenol  (acetaminophen), including Vicodin , Tylenol #3 , Norco , Lortab , and Percocet .  You should not take any extra pills of Tylenol  if you are using these prescription medications or you can get very sick.  Do not ever take more than 3000 mg of acetaminophen in any 24 hour period.    All opioids tend to cause constipation. Drink plenty of water and eat foods that have a lot of fiber, such as fruits, vegetables, prune juice, apple juice and high fiber cereal.  Take a laxative if you don t move your bowels at least every other day. Miralax , Milk of Magnesia, Colace , or Senna  can be used to keep you regular.      Remember that you can always come back to the Emergency Department if you are not able to see your regular doctor in the amount of time listed above, if you get any new symptoms, or if there is anything that worries you.

## 2021-10-10 ENCOUNTER — HEALTH MAINTENANCE LETTER (OUTPATIENT)
Age: 36
End: 2021-10-10

## 2022-05-22 ENCOUNTER — HEALTH MAINTENANCE LETTER (OUTPATIENT)
Age: 37
End: 2022-05-22

## 2022-09-18 ENCOUNTER — HEALTH MAINTENANCE LETTER (OUTPATIENT)
Age: 37
End: 2022-09-18

## 2024-05-05 ENCOUNTER — HEALTH MAINTENANCE LETTER (OUTPATIENT)
Age: 39
End: 2024-05-05